# Patient Record
Sex: MALE | Race: WHITE
[De-identification: names, ages, dates, MRNs, and addresses within clinical notes are randomized per-mention and may not be internally consistent; named-entity substitution may affect disease eponyms.]

---

## 2019-02-26 ENCOUNTER — HOSPITAL ENCOUNTER (EMERGENCY)
Dept: HOSPITAL 75 - ER | Age: 29
Discharge: ANOTHER HOSPITAL | End: 2019-02-26
Payer: SELF-PAY

## 2019-02-26 VITALS — WEIGHT: 190 LBS | BODY MASS INDEX: 26.6 KG/M2 | HEIGHT: 71 IN

## 2019-02-26 VITALS — SYSTOLIC BLOOD PRESSURE: 119 MMHG | DIASTOLIC BLOOD PRESSURE: 65 MMHG
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
## 2019-02-26 DIAGNOSIS — Z98.890: ICD-10-CM

## 2019-02-26 DIAGNOSIS — F17.210: ICD-10-CM

## 2019-02-26 DIAGNOSIS — S91.312A: Primary | ICD-10-CM

## 2019-02-26 DIAGNOSIS — F23: ICD-10-CM

## 2019-02-26 DIAGNOSIS — Z23: ICD-10-CM

## 2019-02-26 LAB
ALBUMIN SERPL-MCNC: 4.6 GM/DL (ref 3.2–4.5)
ALP SERPL-CCNC: 63 U/L (ref 40–136)
ALT SERPL-CCNC: 35 U/L (ref 0–55)
APAP SERPL-MCNC: < 10 UG/ML (ref 10–30)
APTT PPP: YELLOW S
BACTERIA #/AREA URNS HPF: NEGATIVE /HPF
BARBITURATES UR QL: NEGATIVE
BASOPHILS # BLD AUTO: 0.1 10^3/UL (ref 0–0.1)
BASOPHILS NFR BLD AUTO: 1 % (ref 0–10)
BENZODIAZ UR QL SCN: NEGATIVE
BILIRUB SERPL-MCNC: 0.6 MG/DL (ref 0.1–1)
BILIRUB UR QL STRIP: NEGATIVE
BUN/CREAT SERPL: 18
CALCIUM SERPL-MCNC: 10 MG/DL (ref 8.5–10.1)
CHLORIDE SERPL-SCNC: 105 MMOL/L (ref 98–107)
CO2 SERPL-SCNC: 24 MMOL/L (ref 21–32)
COCAINE UR QL: NEGATIVE
CREAT SERPL-MCNC: 0.98 MG/DL (ref 0.6–1.3)
EOSINOPHIL # BLD AUTO: 0.1 10^3/UL (ref 0–0.3)
EOSINOPHIL NFR BLD AUTO: 1 % (ref 0–10)
ERYTHROCYTE [DISTWIDTH] IN BLOOD BY AUTOMATED COUNT: 13.3 % (ref 10–14.5)
FIBRINOGEN PPP-MCNC: CLEAR MG/DL
GFR SERPLBLD BASED ON 1.73 SQ M-ARVRAT: > 60 ML/MIN
GLUCOSE SERPL-MCNC: 93 MG/DL (ref 70–105)
GLUCOSE UR STRIP-MCNC: NEGATIVE MG/DL
HCT VFR BLD CALC: 39 % (ref 40–54)
HGB BLD-MCNC: 13.4 G/DL (ref 13.3–17.7)
KETONES UR QL STRIP: NEGATIVE
LEUKOCYTE ESTERASE UR QL STRIP: NEGATIVE
LYMPHOCYTES # BLD AUTO: 1.2 X 10^3 (ref 1–4)
LYMPHOCYTES NFR BLD AUTO: 10 % (ref 12–44)
MANUAL DIFFERENTIAL PERFORMED BLD QL: NO
MCH RBC QN AUTO: 33 PG (ref 25–34)
MCHC RBC AUTO-ENTMCNC: 34 G/DL (ref 32–36)
MCV RBC AUTO: 96 FL (ref 80–99)
METHADONE UR QL SCN: NEGATIVE
METHAMPHETAMINE SCREEN URINE S: NEGATIVE
MONOCYTES # BLD AUTO: 0.8 X 10^3 (ref 0–1)
MONOCYTES NFR BLD AUTO: 7 % (ref 0–12)
NEUTROPHILS # BLD AUTO: 9.3 X 10^3 (ref 1.8–7.8)
NEUTROPHILS NFR BLD AUTO: 81 % (ref 42–75)
NITRITE UR QL STRIP: NEGATIVE
OPIATES UR QL SCN: NEGATIVE
OXYCODONE UR QL: NEGATIVE
PH UR STRIP: 6 [PH] (ref 5–9)
PLATELET # BLD: 355 10^3/UL (ref 130–400)
PMV BLD AUTO: 9.3 FL (ref 7.4–10.4)
POTASSIUM SERPL-SCNC: 3.7 MMOL/L (ref 3.6–5)
PROPOXYPH UR QL: NEGATIVE
PROT SERPL-MCNC: 7.3 GM/DL (ref 6.4–8.2)
PROT UR QL STRIP: NEGATIVE
RBC #/AREA URNS HPF: (no result) /HPF
SALICYLATES SERPL-MCNC: < 5 MG/DL (ref 5–20)
SODIUM SERPL-SCNC: 139 MMOL/L (ref 135–145)
SP GR UR STRIP: 1.01 (ref 1.02–1.02)
SQUAMOUS #/AREA URNS HPF: (no result) /HPF
TRICYCLICS UR QL SCN: NEGATIVE
UROBILINOGEN UR-MCNC: NORMAL MG/DL
WBC # BLD AUTO: 11.5 10^3/UL (ref 4.3–11)
WBC #/AREA URNS HPF: (no result) /HPF

## 2019-02-26 PROCEDURE — 80320 DRUG SCREEN QUANTALCOHOLS: CPT

## 2019-02-26 PROCEDURE — 80053 COMPREHEN METABOLIC PANEL: CPT

## 2019-02-26 PROCEDURE — 36415 COLL VENOUS BLD VENIPUNCTURE: CPT

## 2019-02-26 PROCEDURE — 80306 DRUG TEST PRSMV INSTRMNT: CPT

## 2019-02-26 PROCEDURE — 12001 RPR S/N/AX/GEN/TRNK 2.5CM/<: CPT

## 2019-02-26 PROCEDURE — 90715 TDAP VACCINE 7 YRS/> IM: CPT

## 2019-02-26 PROCEDURE — 85025 COMPLETE CBC W/AUTO DIFF WBC: CPT

## 2019-02-26 PROCEDURE — 81000 URINALYSIS NONAUTO W/SCOPE: CPT

## 2019-02-26 PROCEDURE — 80329 ANALGESICS NON-OPIOID 1 OR 2: CPT

## 2019-02-26 PROCEDURE — 93005 ELECTROCARDIOGRAM TRACING: CPT

## 2019-02-26 PROCEDURE — 93041 RHYTHM ECG TRACING: CPT

## 2019-02-26 PROCEDURE — 84443 ASSAY THYROID STIM HORMONE: CPT

## 2019-02-26 NOTE — NUR
Raquel from [facility] called back.  Sending pt's info.

## 2019-02-26 NOTE — NUR
Went into pt room to hand second bag of fluid and pt had pulled out IV.  Cancel 
second bag of fluids per Dr. Smith at this time.

## 2019-02-26 NOTE — ED PSYCHOSOCIAL
General


Chief Complaint:  Substance Abuse


Stated Complaint:  LACERATION TO FOOT


Source:  patient, police, EMS


Exam Limitations:  clinical condition





History of Present Illness


Date Seen by Provider:  Feb 26, 2019


Time Seen by Provider:  11:01


Initial Comments


Here with report of laceration to the left foot per police and EMS. Apparently, 

patient was on a spiritual journey and was practicing a  dance 

per report. He apparently dropped a knife on his foot. It does not appear that 

this was an attempt to harm himself but he is quite psychotic on arrival and 

babbling about a lot of different things. He can be redirected. He does not 

seem to be aggressive towards police or staph but also appears to be quite 

psychotic. History otherwise difficult to obtain. Patient denies drug use. 

There was quite a bit of blood in the house and porch. Blood was reportedly 

spurting from wound per EMS which was reported to them as he was bandaged prior 

to their arrival. They left the bandage in place. That does appear to be 

holding the wound and stemming the flow of blood.


Timing/Duration:  this morning


Severity:  moderate, severe


Associated Symptoms:  impaired concentration, other (psychosis)





Allergies and Home Medications


Allergies


Coded Allergies:  


     No Known Drug Allergies (Unverified , 9/17/13)





Home Medications


Azithromycin 250 Mg Tab, 0 PO Z-CHRIS


   Prescribed by: ABDI WEST on 9/17/13 0258


Hydrocodone Bit/Acetaminophen 1 Each Tablet, 1 EACH PO Q4H


   Prescribed by: CHLOE DOMINGUEZ on 10/24/16 2046





Patient Home Medication List


Home Medication List Reviewed:  No (unable to determine due to altered mental 

status)





Review of Systems


Constitutional:  see HPI


Psychiatric/Neurological:  See HPI, Depressed, Other (acute psychosis)


Unable to complete due to psychosis.





Past Medical-Social-Family Hx


Past Med/Social Hx:  Reviewed Nursing Past Med/Soc Hx


Patient Social History


Alcohol Use:  Denies Use


Recreational Drug Use:  No


Smoking Status:  Current Everyday Smoker


Type Used:  Cigarettes


Recent Hopitalizations:  No





Past Medical History


Surgeries:  Yes


Abdominal


Reproductive Disorders:  No


Sexually Transmitted Disease:  No


HIV/AIDS:  No


Adverse Reaction/Blood Tranf:  No


Per records as patient is unable to provide history coherently





Physical Exam





Vital Signs - First Documented








 2/26/19





 10:50


 


Temp 98.2


 


Pulse 119


 


Resp 27


 


B/P (MAP) 142/105 (117)


 


Pulse Ox 98


 


O2 Delivery Room Air





Capillary Refill :


Height, Weight, BMI


Height: 5'11"


Weight: 230lbs. oz. 104.936478ud; 24.40 BMI


Method:Stated


General Appearance:  WD/WN, moderate distress


HEENT:  PERRL/EOMI, pharynx normal


Neck:  full range of motion, supple


Respiratory:  lungs clear, normal breath sounds


Cardiovascular:  regular rate, rhythm, no murmur


Gastrointestinal:  non tender, soft


Extremities:  non-tender, other (1.5 similar laceration to the top of the left 

foot at the bridge. Bleeding noted 1 dressing taken down but controlled with 

direct pressure easily.)


Neurologic/Psychiatric:  alert, other (singing and babbling but can be 

redirected.)


Appearance/Memory:  disheveled, impaired insight


Behavior/Eye Contact:  increased rate of speech, compulsive


Thoughts/Hallucinations:  delusions, flight of ideas


Skin:  warm/dry, other (laceration as listed above)


Lymphatic:  no adenopathy





Progress/Results/Core Measures


Results/Orders


Lab Results





Laboratory Tests








Test


 2/26/19


10:50 2/26/19


11:15 Range/Units


 


 


White Blood Count


 11.5 H


 


 4.3-11.0


10^3/uL


 


Red Blood Count


 4.07 L


 


 4.35-5.85


10^6/uL


 


Hemoglobin 13.4   13.3-17.7  G/DL


 


Hematocrit 39 L  40-54  %


 


Mean Corpuscular Volume 96   80-99  FL


 


Mean Corpuscular Hemoglobin 33   25-34  PG


 


Mean Corpuscular Hemoglobin


Concent 34 


 


 32-36  G/DL





 


Red Cell Distribution Width 13.3   10.0-14.5  %


 


Platelet Count


 355 


 


 130-400


10^3/uL


 


Mean Platelet Volume 9.3   7.4-10.4  FL


 


Neutrophils (%) (Auto) 81 H  42-75  %


 


Lymphocytes (%) (Auto) 10 L  12-44  %


 


Monocytes (%) (Auto) 7   0-12  %


 


Eosinophils (%) (Auto) 1   0-10  %


 


Basophils (%) (Auto) 1   0-10  %


 


Neutrophils # (Auto) 9.3 H  1.8-7.8  X 10^3


 


Lymphocytes # (Auto) 1.2   1.0-4.0  X 10^3


 


Monocytes # (Auto) 0.8   0.0-1.0  X 10^3


 


Eosinophils # (Auto)


 0.1 


 


 0.0-0.3


10^3/uL


 


Basophils # (Auto)


 0.1 


 


 0.0-0.1


10^3/uL


 


Sodium Level 139   135-145  MMOL/L


 


Potassium Level 3.7   3.6-5.0  MMOL/L


 


Chloride Level 105     MMOL/L


 


Carbon Dioxide Level 24   21-32  MMOL/L


 


Anion Gap 10   5-14  MMOL/L


 


Blood Urea Nitrogen 18   7-18  MG/DL


 


Creatinine


 0.98 


 


 0.60-1.30


MG/DL


 


Estimat Glomerular Filtration


Rate > 60 


 


  





 


BUN/Creatinine Ratio 18    


 


Glucose Level 93     MG/DL


 


Calcium Level 10.0   8.5-10.1  MG/DL


 


Corrected Calcium    8.5-10.1  MG/DL


 


Total Bilirubin 0.6   0.1-1.0  MG/DL


 


Aspartate Amino Transf


(AST/SGOT) 30 


 


 5-34  U/L





 


Alanine Aminotransferase


(ALT/SGPT) 35 


 


 0-55  U/L





 


Alkaline Phosphatase 63     U/L


 


Total Protein 7.3   6.4-8.2  GM/DL


 


Albumin 4.6 H  3.2-4.5  GM/DL


 


TSH Cascade Testing


 1.46 


 


 0.35-4.94


UIU/ML


 


Salicylates Level < 5.0 L  5.0-20.0  MG/DL


 


Acetaminophen Level < 10 L  10-30  UG/ML


 


Serum Alcohol < 10   <10  MG/DL


 


Urine Color  YELLOW   


 


Urine Clarity  CLEAR   


 


Urine pH  6  5-9  


 


Urine Specific Gravity  1.010 L 1.016-1.022  


 


Urine Protein  NEGATIVE  NEGATIVE  


 


Urine Glucose (UA)  NEGATIVE  NEGATIVE  


 


Urine Ketones  NEGATIVE  NEGATIVE  


 


Urine Nitrite  NEGATIVE  NEGATIVE  


 


Urine Bilirubin  NEGATIVE  NEGATIVE  


 


Urine Urobilinogen  NORMAL  NORMAL  MG/DL


 


Urine Leukocyte Esterase  NEGATIVE  NEGATIVE  


 


Urine RBC (Auto)  NEGATIVE  NEGATIVE  


 


Urine RBC  NONE   /HPF


 


Urine WBC  NONE   /HPF


 


Urine Squamous Epithelial


Cells 


 RARE 


  /HPF





 


Urine Crystals  NONE   /LPF


 


Urine Bacteria  NEGATIVE   /HPF


 


Urine Casts  NONE   /LPF


 


Urine Mucus  NEGATIVE   /LPF


 


Urine Culture Indicated  NO   


 


Urine Opiates Screen  NEGATIVE  NEGATIVE  


 


Urine Oxycodone Screen  NEGATIVE  NEGATIVE  


 


Urine Methadone Screen  NEGATIVE  NEGATIVE  


 


Urine Propoxyphene Screen  NEGATIVE  NEGATIVE  


 


Urine Barbiturates Screen  NEGATIVE  NEGATIVE  


 


Ur Tricyclic Antidepressants


Screen 


 NEGATIVE 


 NEGATIVE  





 


Urine Phencyclidine Screen  NEGATIVE  NEGATIVE  


 


Urine Amphetamines Screen  NEGATIVE  NEGATIVE  


 


Urine Methamphetamines Screen  NEGATIVE  NEGATIVE  


 


Urine Benzodiazepines Screen  NEGATIVE  NEGATIVE  


 


Urine Cocaine Screen  NEGATIVE  NEGATIVE  


 


Urine Cannabinoids Screen  POSITIVE H NEGATIVE  








My Orders





Orders - [place],DAVE D MD


Ua Culture If Indicated (2/26/19 11:07)


Cbc With Automated Diff (2/26/19 11:07)


Comprehensive Metabolic Panel (2/26/19 11:07)


Alcohol (2/26/19 11:07)


Drug Screen Stat (Urine) (2/26/19 11:07)


Acetaminophen (2/26/19 11:07)


Salicylate (2/26/19 11:07)


Ekg Tracing (2/26/19 11:07)


Saline Lock/Iv-Start (2/26/19 11:07)


Thyroid Analyzer (2/26/19 11:07)


Monitor-Rhythm Ecg Trace Only (2/26/19 11:07)


Bh Status Checks/Observation Q15M (2/26/19 11:07)


Ns Iv 1000 Ml (Sodium Chloride 0.9%) (2/26/19 11:07)


Ziprasidone Injection (Geodon Injection) (2/26/19 11:15)


Dipht,Pertuss(Acell),Tet Adult (Boostrix (2/26/19 11:07)


Lidocaine 2% Injection 20 Ml (Xylocaine (2/26/19 11:07)


Lidocaine 2% Pf 5 Ml (Xylocaine 2% Pf) (2/26/19 11:10)


Lidocaine/Epi 2% 1:100,000 (Xylocaine/Ep (2/26/19 11:11)


Ziprasidone Injection (Geodon Injection) (2/26/19 10:47)


Water (Sterile) For Injection (Sterile W (2/26/19 10:48)


General/Regular (2/26/19 Lunch)


Olanzapine Orally Dissolve Tab (Zyprexa (2/26/19 18:15)





Medications Given in ED





Current Medications








 Medications  Dose


 Ordered  Sig/Wyatt


 Route  Start Time


 Stop Time Status Last Admin


Dose Admin


 


 Lidocaine/


 Epinephrine  20 ml  STK-MED ONCE


 .ROUTE  2/26/19 11:11


 2/26/19 11:17 DC 2/26/19 12:02


1 ML


 


 Sterile Water  10 ml @ ud  STK-MED ONCE


 .ROUTE  2/26/19 10:48


 2/26/19 13:01 DC 2/26/19 10:57


1.2 MLS/HR


 


 Ziprasidone  20 mg  ONCE  ONCE


 IM  2/26/19 11:15


 2/26/19 11:16 DC 2/26/19 10:57


20 MG








Vital Signs/I&O











 2/26/19





 10:50


 


Temp 98.2


 


Pulse 119


 


Resp 27


 


B/P (MAP) 142/105 (117)


 


Pulse Ox 98


 


O2 Delivery Room Air











Progress


Progress Note :  


Progress Note


Seen and evaluated on arrival by EMS and police. Geodon 20 mg IM ordered which 

patient accepted. Wound cleaned with copious saline and redressed. We will 

suture wound as patient calms down. Labs, EKG, IV and normal saline 1 L bolus 

ordered. We will also check UA. Monitor patient. 1300: We will get [facility] to evaluate patient. 1430: Patient was talking with the 

mental health counselor and apparently got up and pulled out all of his lines 

and disconnected himself and walked out of the emergency department and just a 

blanket despite repeated attempts by staff to encourage him back to the room. 

Police were called. Patient went to the hallway and out the front door. He 

attempted to get into a car that was in the cervical parking for the emergency 

department. He then came back into the hallway and ultimately went to the 

bathroom until police arrived. He was then encouraged back to the room for 

continuation of mental health evaluation. Patient is medically cleared for 

screening. Wound was closed earlier by REYES [place]. 1535: Patient is 

more accepting of care and evaluation. Mental health screener has completed her 

screen. She thinks patient needs admitted for acute psychosis for which I 

agree. Patient was offered the choice of voluntary inpatient treatment which 

she has accepted. He has been to [facility] and states that those 

are good people and he will happily go back there. We will pursue that option. 

Regular diet ordered. Monitor patient. 1815: I have discussed the case with the 

nurse practitioner [facility] and she accepts patient for Dr. Westbrook. We will send patient by EMS. To decrease worsening of psychosis during 

transfer, we will give Zyprexa Zydis 5 mg by mouth now. Patient will go by 

[facility]. Patient agrees to plan.





From wound care would include daily dressing change and antibiotic ointment to 

wound. May shower but do not soak wound. Sutures out in 10-14 days.





Departure


Impression





 Primary Impression:  


 Acute psychosis


 Additional Impression:  


 Foot laceration


 Qualified Codes:  S91.312A - Laceration without foreign body, left foot, 

initial encounter


Disposition:  02 XFER SHT-TRM HOSP


Condition:  Stable





Transfer


Transfer Time:  18:15


Transfer Facility:  


[place], [place], Dr. Westbrook accepting


Method of Transfer:  EMS





Departure-Patient Inst.


Referrals:  


NO,LOCAL PHYSICIAN (PCP/Family)


Primary Care Physician











DAVE CADE MD Feb 26, 2019 11:49

## 2019-02-26 NOTE — NUR
Wendy from [facility] returned call and there is a bed available.  Waiting to 
hear from Ekta at this time.

## 2019-02-26 NOTE — NUR
Pt in room with screener from [facility].  Pt became agitated, began 
pulling BP cuff and monitors off and ran out of room only wearing a blanket.  
Javon Bland, and Alfonso followed pt into ED waiting room.  PPD called at 
this time.  Pt in waiting room restroom.

## 2019-02-26 NOTE — NUR
Spoke to Tracy at [facility] regarding pt.  Tracy will either call back or 
come out to screen pt.

## 2019-02-26 NOTE — XMS REPORT
Continuity of Care Document

 Created on: 2019



ASIM CALDERON

External Reference #: [id number]

: 1990

Sex: Male



Demographics







 Address  [address] [place]  [address]

 

 Home Phone  [phone] x

 

 Preferred Language  Unknown

 

 Marital Status  Unknown

 

 Restoration Affiliation  Unknown

 

 Race  Unknown

 

 Ethnic Group  Unknown





Author







 Author  [facility]

 

 Organization  [facility]

 

 Address  Unknown

 

 Phone  Unavailable



              



Allergies

      





 Active            Description            Code            Type            
Severity            Reaction            Onset            Reported/Identified   
         Relationship to Patient            Clinical Status        

 

 Yes            No Known Drug Allergies            [id number]            Drug 
Allergy            Unknown            N/A                         2013   
                               



                  



Medications

      



There is no data.                  



Problems

      





 Date Dx Coded            Attending            Type            Code            
Diagnosis            Diagnosed By        

 

 10/24/2016            CHLOE DOMINGUEZ DO K            Ot            F17.210        
    NICOTINE DEPENDENCE, CIGARETTES, UNCOMPL                     

 

 10/24/2016            ANGELICA DO CHLOE K            Ot            H10.89         
   OTHER CONJUNCTIVITIS                     

 

 10/24/2016            ANGELICA DO CHLOE K            Ot            H16.8          
  OTHER KERATITIS                     

 

 10/24/2016            ANGELICA DO CHLOE K            Ot            H57.8          
  OTHER SPECIFIED DISORDERS OF EYE AND ADN                     

 

 10/24/2016            ANGELICA DO CHLOE K            Ot            S05.01XA       
     INJ CONJUNCTIVA AND CORNEAL ABRASION W/O                     

 

 10/24/2016            ANGELICA DO CHLOE K            Ot            X58.XXXA       
     EXPOSURE TO OTHER SPECIFIED FACTORS, INI                     

 

 10/24/2016            FELIX DOMINGUEZ DOA K            Ot            Y92.69         
   OTH INDUSTRIAL AND CONSTRUCTION AREA AS                      

 

 10/24/2016            ANGELICA DO CHLOE K            Ot            Y93.89         
   ACTIVITY, OTHER SPECIFIED                     

 

 10/24/2016            ANGELICA DO CHLOE K            Ot            Y99.8          
  OTHER EXTERNAL CAUSE STATUS                     

 

 10/24/2016            ANGELICA DO CHLOE K            Ot            Z23            
ENCOUNTER FOR IMMUNIZATION                     

 

 10/26/2016            ANGELICA DO CHLOE K            Ot            F17.210        
    NICOTINE DEPENDENCE, CIGARETTES, UNCOMPL                     

 

 10/26/2016            ANGELICA DO CHLOE K            Ot            H10.89         
   OTHER CONJUNCTIVITIS                     

 

 10/26/2016            ANGELICA DO CHLOE K            Ot            H16.8          
  OTHER KERATITIS                     

 

 10/26/2016            ANGELICA DO CHLOE K            Ot            H57.8          
  OTHER SPECIFIED DISORDERS OF EYE AND ADN                     

 

 10/26/2016            ANGELICA DO CHLOE K            Ot            S05.01XA       
     INJ CONJUNCTIVA AND CORNEAL ABRASION W/O                     

 

 10/26/2016            ANGELICA DO, CHLOE K            Ot            X58.XXXA       
     EXPOSURE TO OTHER SPECIFIED FACTORS, INI                     

 

 10/26/2016            CHLOE DOMINGUEZ DO            Ot            Y92.69         
   OTH INDUSTRIAL AND CONSTRUCTION AREA AS                      

 

 10/26/2016            CHLOE DOMINGUEZ DO            Ot            Y93.89         
   ACTIVITY, OTHER SPECIFIED                     

 

 10/26/2016            CHLOE DOMINGUEZ DO            Ot            Y99.8          
  OTHER EXTERNAL CAUSE STATUS                     

 

 10/26/2016            CHLOE DOMINGUEZ DO            Ot            Z23            
ENCOUNTER FOR IMMUNIZATION                     



                                                        



Procedures

      



There is no data.                  



Results

      



There is no data.              



Encounters

      





 ACCT No.            Visit Date/Time            Discharge            Status    
        Pt. Type            Provider            Facility            Loc./Unit  
          Complaint        

 

 [id number]            10/24/2016 20:28:00            10/24/2016 21:15:00    
        [facility]            Emergency            CHLOE DOMINGUEZ DO            [facility]            ER            EYE REDNESS/SWELLING/PAIN
        

 

 [id number]            2013 02:24:00            2013 03:23:00    
        [facility]            Emergency